# Patient Record
Sex: FEMALE | Race: BLACK OR AFRICAN AMERICAN
[De-identification: names, ages, dates, MRNs, and addresses within clinical notes are randomized per-mention and may not be internally consistent; named-entity substitution may affect disease eponyms.]

---

## 2017-10-08 ENCOUNTER — HOSPITAL ENCOUNTER (EMERGENCY)
Dept: HOSPITAL 72 - EMR | Age: 27
Discharge: HOME | End: 2017-10-08
Payer: MEDICAID

## 2017-10-08 VITALS — DIASTOLIC BLOOD PRESSURE: 82 MMHG | SYSTOLIC BLOOD PRESSURE: 124 MMHG

## 2017-10-08 VITALS — BODY MASS INDEX: 22.71 KG/M2 | HEIGHT: 64 IN | WEIGHT: 133 LBS

## 2017-10-08 DIAGNOSIS — O03.9: Primary | ICD-10-CM

## 2017-10-08 LAB
ALBUMIN/GLOB SERPL: 1.2 {RATIO} (ref 1–2.7)
ALT SERPL-CCNC: 14 U/L (ref 12–78)
ANION GAP SERPL CALC-SCNC: 7 MMOL/L (ref 5–15)
APPEARANCE UR: (no result)
APTT BLD: 28 SEC (ref 23–33)
AST SERPL-CCNC: 16 U/L (ref 15–37)
BACTERIA #/AREA URNS HPF: (no result) /HPF
BASOPHILS NFR BLD AUTO: 1.4 % (ref 0–2)
CALCIUM SERPL-MCNC: 8.9 MG/DL (ref 8.5–10.1)
CHLORIDE SERPL-SCNC: 107 MMOL/L (ref 98–107)
CO2 SERPL-SCNC: 26 MMOL/L (ref 21–32)
CREAT SERPL-MCNC: 0.9 MG/DL (ref 0.55–1)
EOSINOPHIL NFR BLD AUTO: 2.9 % (ref 0–3)
ERYTHROCYTE [DISTWIDTH] IN BLOOD BY AUTOMATED COUNT: 10.9 % (ref 11.6–14.8)
GFR SERPLBLD BASED ON 1.73 SQ M-ARVRAT: > 60 ML/MIN (ref 60–?)
GLOBULIN SER-MCNC: 3.4 G/DL
INR PPP: 1.1 (ref 0.9–1.1)
KETONES UR QL STRIP: NEGATIVE
LEUKOCYTE ESTERASE UR QL STRIP: NEGATIVE
LYMPHOCYTES NFR BLD AUTO: 33.9 % (ref 20–45)
MCH RBC QN AUTO: 32.5 PG (ref 27–31)
MCHC RBC AUTO-ENTMCNC: 33.3 G/DL (ref 32–36)
MCV RBC AUTO: 98 FL (ref 80–99)
MONOCYTES NFR BLD AUTO: 9.4 % (ref 1–10)
NEUTROPHILS NFR BLD AUTO: 52.4 % (ref 45–75)
NITRITE UR QL STRIP: NEGATIVE
PH UR STRIP: 7 [PH] (ref 4.5–8)
PLATELET # BLD: 244 K/UL (ref 150–450)
PMV BLD AUTO: 7.7 FL (ref 6.5–10.1)
POTASSIUM SERPL-SCNC: 4.1 MMOL/L (ref 3.5–5.1)
PROT SERPL-MCNC: 7.5 G/DL (ref 6.4–8.2)
PROT UR QL STRIP: (no result)
PROTHROMBIN TIME: 11.1 SEC (ref 9.3–11.5)
RBC # BLD AUTO: 4.34 M/UL (ref 4.2–5.4)
RBC #/AREA URNS HPF: (no result) /HPF (ref 0–2)
SODIUM SERPL-SCNC: 140 MMOL/L (ref 136–145)
SP GR UR STRIP: 1.01 (ref 1–1.03)
SQUAMOUS #/AREA URNS LPF: (no result) /LPF
UROBILINOGEN UR-MCNC: NORMAL MG/DL (ref 0–1)
WBC # BLD AUTO: 6.7 K/UL (ref 4.8–10.8)
WBC #/AREA URNS HPF: (no result) /HPF (ref 0–2)

## 2017-10-08 PROCEDURE — 96361 HYDRATE IV INFUSION ADD-ON: CPT

## 2017-10-08 PROCEDURE — 36415 COLL VENOUS BLD VENIPUNCTURE: CPT

## 2017-10-08 PROCEDURE — 81025 URINE PREGNANCY TEST: CPT

## 2017-10-08 PROCEDURE — 96374 THER/PROPH/DIAG INJ IV PUSH: CPT

## 2017-10-08 PROCEDURE — 85730 THROMBOPLASTIN TIME PARTIAL: CPT

## 2017-10-08 PROCEDURE — 85025 COMPLETE CBC W/AUTO DIFF WBC: CPT

## 2017-10-08 PROCEDURE — 86900 BLOOD TYPING SEROLOGIC ABO: CPT

## 2017-10-08 PROCEDURE — 86901 BLOOD TYPING SEROLOGIC RH(D): CPT

## 2017-10-08 PROCEDURE — 81003 URINALYSIS AUTO W/O SCOPE: CPT

## 2017-10-08 PROCEDURE — 86850 RBC ANTIBODY SCREEN: CPT

## 2017-10-08 PROCEDURE — 84702 CHORIONIC GONADOTROPIN TEST: CPT

## 2017-10-08 PROCEDURE — 85610 PROTHROMBIN TIME: CPT

## 2017-10-08 PROCEDURE — 80053 COMPREHEN METABOLIC PANEL: CPT

## 2017-10-08 PROCEDURE — 99284 EMERGENCY DEPT VISIT MOD MDM: CPT

## 2017-10-08 NOTE — EMERGENCY ROOM REPORT
History of Present Illness


General


Chief Complaint:  General Complaint


Source:  Patient





Present Illness


HPI


The patient is a 27-year-old female  presenting for possible miscarriage.  

She states that she is approximately one week late for her menstruation which 

is usually on time.  She is sexually active without use of contraceptives.  She 

states that she noticed a large piece of tissue passed yesterday as well as 

abdominal cramping.  She has been having spotting today with nausea but denies 

vomiting.  She denies any pain at this time.  She denies any other symptoms 

including fever, chills, back pain, dysuria


Allergies:  


Coded Allergies:  


     No Known Allergies (Unverified , 10/8/17)





Patient History


Past Medical History:  see triage record


Pertinent Family History:  none


Last Menstrual Period:  16


Reviewed Nursing Documentation:  PMH: Agreed, PSxH: Agreed





Nursing Documentation-PMH


Past Medical History:  No Stated History





Review of Systems


All Other Systems:  negative except mentioned in HPI





Physical Exam





Vital Signs








  Date Time  Temp Pulse Resp B/P (MAP) Pulse Ox O2 Delivery O2 Flow Rate FiO2


 


10/8/17 13:02 98.1 78 20 124/82 99 Room Air  








Sp02 EP Interpretation:  reviewed, normal


General Appearance:  no apparent distress, alert, GCS 15, non-toxic


Head:  normocephalic, atraumatic


Eyes:  bilateral eye normal inspection, bilateral eye PERRL


ENT:  hearing grossly normal, normal pharynx, no angioedema, normal voice


Gastrointestinal:  tenderness - suprapubic


Rectal:  deferred


Genitourinary:  normal inspection, no CVA tenderness


Musculoskeletal:  back normal, gait/station normal, normal range of motion, non-

tender


Neurologic:  alert, oriented x3, responsive, motor strength/tone normal, 

sensory intact, speech normal


Psychiatric:  judgement/insight normal, memory normal, mood/affect normal, no 

suicidal/homicidal ideation


Skin:  normal color, no rash, warm/dry, well hydrated


Lymphatic:  no adenopathy





Medical Decision Making


PA Attestation


Dr. Devine is my supervising physician. Patient management was discussed with 

my supervising physician


Diagnostic Impression:  


 Primary Impression:  


 Miscarriage


ER Course


The patient is a 27-year-old female  presenting for possible miscarriage





Differential diagnoses considered include but not limited to Early pregnancy, 

threatened , incomplete , complete , ectopic pregnancy, 

hemorrhagic cyst





PE: vitals stable. NAD


Abdomen is soft.  There is tenderness to palpation over suprapubic region only.

  No CVA tenderness





CBC, CMP unremarkable. 


Urine preg: neg


Beta hC


UA: no signs of infection





The patient is given IV fluids and Zofran and is feeling better.





She was informed of the results.  It is most likely patient has experienced a 

miscarriage.





ER precautions are





Laboratory Tests








Test


  10/8/17


13:08 10/8/17


13:50


 


Urine Color Pale yellow   


 


Urine Appearance Cloudy   


 


Urine pH 7 (4.5-8.0)   


 


Urine Specific Gravity


  1.015


(1.005-1.035) 


 


 


Urine Protein


  1+ (NEGATIVE)


H 


 


 


Urine Glucose (UA)


  Negative


(NEGATIVE) 


 


 


Urine Ketones


  Negative


(NEGATIVE) 


 


 


Urine Occult Blood


  5+ (NEGATIVE)


H 


 


 


Urine Nitrite


  Negative


(NEGATIVE) 


 


 


Urine Bilirubin


  Negative


(NEGATIVE) 


 


 


Urine Urobilinogen


  Normal MG/DL


(0.0-1.0) 


 


 


Urine Leukocyte Esterase


  Negative


(NEGATIVE) 


 


 


Urine RBC


  Tntc /HPF (0 -


2)  H 


 


 


Urine WBC


  2-4 /HPF (0 -


2) 


 


 


Urine Squamous Epithelial


Cells Moderate /LPF


(NONE/OCC)  H 


 


 


Urine Bacteria


  Few /HPF


(NONE) 


 


 


Urine HCG, Qualitative Negative   


 


White Blood Count


  


  6.7 K/UL


(4.8-10.8)


 


Red Blood Count


  


  4.34 M/UL


(4.20-5.40)


 


Hemoglobin


  


  14.1 G/DL


(12.0-16.0)


 


Hematocrit


  


  42.3 %


(37.0-47.0)


 


Mean Corpuscular Volume  98 FL (80-99)  


 


Mean Corpuscular Hemoglobin


  


  32.5 PG


(27.0-31.0)  H


 


Mean Corpuscular Hemoglobin


Concent 


  33.3 G/DL


(32.0-36.0)


 


Red Cell Distribution Width


  


  10.9 %


(11.6-14.8)  L


 


Platelet Count


  


  244 K/UL


(150-450)


 


Mean Platelet Volume


  


  7.7 FL


(6.5-10.1)


 


Neutrophils (%) (Auto)


  


  52.4 %


(45.0-75.0)


 


Lymphocytes (%) (Auto)


  


  33.9 %


(20.0-45.0)


 


Monocytes (%) (Auto)


  


  9.4 %


(1.0-10.0)


 


Eosinophils (%) (Auto)


  


  2.9 %


(0.0-3.0)


 


Basophils (%) (Auto)


  


  1.4 %


(0.0-2.0)


 


Prothrombin Time


  


  11.1 SEC


(9.30-11.50)


 


Prothrombin Time INR  1.1 (0.9-1.1)  


 


PTT


  


  28 SEC (23-33)


 


 


Sodium Level


  


  140 MMOL/L


(136-145)


 


Potassium Level


  


  4.1 MMOL/L


(3.5-5.1)


 


Chloride Level


  


  107 MMOL/L


()


 


Carbon Dioxide Level


  


  26 MMOL/L


(21-32)


 


Anion Gap  7 (5-15)  


 


Blood Urea Nitrogen


  


  6 mg/dL (7-18)


L


 


Creatinine


  


  0.9 MG/DL


(0.55-1.00)


 


Estimate Glomerular


Filtration Rate 


  > 60 mL/min


(>60)


 


Glucose Level


  


  82 MG/DL


()


 


Calcium Level


  


  8.9 MG/DL


(8.5-10.1)


 


Total Bilirubin


  


  0.7 MG/DL


(0.2-1.0)


 


Aspartate Amino Transferase


(AST) 


  16 U/L (15-37)


 


 


Alanine Aminotransferase (ALT)


  


  14 U/L (12-78)


 


 


Alkaline Phosphatase


  


  59 U/L


()


 


Total Protein


  


  7.5 G/DL


(6.4-8.2)


 


Albumin


  


  4.1 G/DL


(3.4-5.0)


 


Globulin  3.4 g/dL  


 


Albumin/Globulin Ratio  1.2 (1.0-2.7)  


 


Human Chorionic Gonadotropin,


Quant 


  < 1 mIU/mL  


 








Lab Results Impression


CBC, CMP unremarkable. 


Urine preg: neg


Beta hC


UA: no signs of infection





Last Vital Signs








  Date Time  Temp Pulse Resp B/P (MAP) Pulse Ox O2 Delivery O2 Flow Rate FiO2


 


10/8/17 13:02 98.1 78 20 124/82 99 Room Air  








Status:  improved


Disposition:  HOME, SELF-CARE


Condition:  Improved


Scripts


Ondansetron* (ZOFRAN*) 4 Mg Tablet


4 MG ORAL Q6H Y for Nausea & Vomiting, #10 TAB


   Prov: LEISA FLOREZ P.AWendy         10/8/17 


Acetaminophen* (TYLENOL EXTRA STRENGTH*) 500 Mg Tablet


500 MG ORAL Q8H Y for Prn Headache/Temp > 101, #30 TAB 0 Refills


   Prov: LEISA FLOREZ P.A.         10/8/17











LEISA FLOREZ P.PANDA Oct 8, 2017 13:24

## 2018-03-18 ENCOUNTER — HOSPITAL ENCOUNTER (EMERGENCY)
Dept: HOSPITAL 72 - EMR | Age: 28
Discharge: HOME | End: 2018-03-18
Payer: MEDICAID

## 2018-03-18 VITALS — SYSTOLIC BLOOD PRESSURE: 117 MMHG | DIASTOLIC BLOOD PRESSURE: 82 MMHG

## 2018-03-18 VITALS — WEIGHT: 130 LBS | BODY MASS INDEX: 22.2 KG/M2 | HEIGHT: 64 IN

## 2018-03-18 DIAGNOSIS — N93.8: Primary | ICD-10-CM

## 2018-03-18 LAB
APPEARANCE UR: CLEAR
APTT PPP: YELLOW S
GLUCOSE UR STRIP-MCNC: NEGATIVE MG/DL
KETONES UR QL STRIP: NEGATIVE
LEUKOCYTE ESTERASE UR QL STRIP: (no result)
NITRITE UR QL STRIP: NEGATIVE
PH UR STRIP: 5 [PH] (ref 4.5–8)
PROT UR QL STRIP: (no result)
SP GR UR STRIP: 1.02 (ref 1–1.03)
UROBILINOGEN UR-MCNC: NORMAL MG/DL (ref 0–1)

## 2018-03-18 PROCEDURE — 81003 URINALYSIS AUTO W/O SCOPE: CPT

## 2018-03-18 PROCEDURE — 81025 URINE PREGNANCY TEST: CPT

## 2018-03-18 PROCEDURE — 76856 US EXAM PELVIC COMPLETE: CPT

## 2018-03-18 PROCEDURE — 36415 COLL VENOUS BLD VENIPUNCTURE: CPT

## 2018-03-18 PROCEDURE — 99284 EMERGENCY DEPT VISIT MOD MDM: CPT

## 2018-03-18 NOTE — DIAGNOSTIC IMAGING REPORT
Indication: Pelvic pain. Last menstrual period 2/18/2018. 

 

Technique: Transabdominal and endovaginal pelvic ultrasound was performed.

 

Findings: The uterus measures 7.1 x 5 x 4.3 cm. Endometrium is 1.3 m in thickness.

Within the lower endometrial canal there is some echogenic material. No color flow is

noted within this material upon interrogation with Doppler.

 

Right ovary measures 4.1 x 3.3 x 2.1 cm. The left ovary measures 3. 2 x 2 1.8 cm.

Both ovaries contain multiple small simple appearing/follicular cysts. Vascular flow

to the bilateral ovaries is seen.

 

IMPRESSION: 

Echogenic material in the endometrial . If patient recently pregnant this may

represent retained products. There is no history of pregnancy, this may represent

menstrual blood. Correlation with clinical history/exam and beta hCG recommended.

Short-term interval follow-up ultrasound was recommended to assure resolution of this

finding.

 

No evidence to suggest ovarian torsion at this time.

## 2023-02-03 NOTE — EMERGENCY ROOM REPORT
History of Present Illness


General


Chief Complaint:  Vaginal


Source:  Patient





Present Illness


HPI


Patient presents with complaints of abnormal vaginal bleeding and possible 

miscarriage


Patient reports that her last menstrual cycle was last week


She only had bleeding for a few days


Which is not normal for her


She has continued to have right lower suprapubic discomfort and pressure since 

then


And presents for further eval


Denies any lower abdominal pain denies any vaginal discharge and as any dysuria 

or frequency denies any upper abdominal pain


Allergies:  


Coded Allergies:  


     No Known Allergies (Unverified , 10/8/17)





Patient History


Past Medical History:  see triage record


Pertinent Family History:  none


Last Menstrual Period:  2-18


:  3


Para:  0


Reviewed Nursing Documentation:  PMH: Agreed, PSxH: Agreed





Nursing Documentation-PMH


Past Medical History:  No Stated History





Review of Systems


All Other Systems:  negative except mentioned in HPI





Physical Exam





Vital Signs








  Date Time  Temp Pulse Resp B/P (MAP) Pulse Ox O2 Delivery O2 Flow Rate FiO2


 


3/18/18 10:44 98.0 83 18 117/82 98 Room Air  





 98.1       








Sp02 EP Interpretation:  reviewed, normal


General Appearance:  well appearing, no apparent distress


Head:  normocephalic, atraumatic


Eyes:  bilateral eye PERRL, bilateral eye EOMI


ENT:  hearing grossly normal, normal pharynx, TMs + canals normal, uvula midline


Neck:  full range of motion, supple, no meningismus, no bony tend


Respiratory:  lungs clear, normal breath sounds, no rhonchi, no respiratory 

distress, no retraction, no accessory muscle use


Cardiovascular #1:  normal peripheral pulses, regular rate, rhythm, no edema, 

no gallop, no JVD, no murmur


Gastrointestinal:  normal bowel sounds, non tender - Patient subjectively 

points to the right suprapubic area, soft, no mass, no organomegaly, non-

distended, no guarding, no hernia, no pulsatile mass, no rebound


Genitourinary:  no CVA tenderness


Musculoskeletal:  normal inspection


Neurologic:  oriented x3, responsive, CNs III-XII nml as tested, motor strength/

tone normal, sensory intact


Psychiatric:  mood/affect normal


Skin:  normal color, no rash, warm/dry, palpation normal


Lymphatic:  normal inspection, no adenopathy





Medical Decision Making


Diagnostic Impression:  


 Primary Impression:  


 dysfunctional uterine bleeding


ER Course


With the patient's history and examination, multiple differentials considered, 

including but not limited to pregnancy, ectopic pregnancy, ovarian torsion, 

gastritis, cholecystitis, pancreatitis, appendicitis





Patient's urine pregnancy test was negative


Ultrasound does not show any obvious torsion or cyst





Consideration for appendicitis is also made however the location of discomfort 

is suprapubic does not involve the abdominal region





Patient also afebrile and no complaints of any intra-abdominal discomfort 

patient nevertheless requires close outpatient follow-up and will return with 

any concerns or changes





Labs








Test


  3/18/18


10:50 3/18/18


12:15


 


Urine Color Yellow  


 


Urine Appearance Clear  


 


Urine pH 5 (4.5-8.0)  


 


Urine Specific Gravity


  1.020


(1.005-1.035) 


 


 


Urine Protein 2+ (NEGATIVE)  


 


Urine Glucose (UA)


  Negative


(NEGATIVE) 


 


 


Urine Ketones


  Negative


(NEGATIVE) 


 


 


Urine Occult Blood


  Negative


(NEGATIVE) 


 


 


Urine Nitrite


  Negative


(NEGATIVE) 


 


 


Urine Bilirubin


  Negative


(NEGATIVE) 


 


 


Urine Urobilinogen


  Normal MG/DL


(0.0-1.0) 


 


 


Urine Leukocyte Esterase 1+ (NEGATIVE)  


 


Urine RBC


  0-2 /HPF (0 -


2) 


 


 


Urine WBC


  2-4 /HPF (0 -


2) 


 


 


Urine Squamous Epithelial


Cells Few /LPF


(NONE/OCC) 


 


 


Urine Bacteria


  Occasional


/HPF (NONE) 


 


 


Urine HCG, Qualitative


  Negative


(NEGATIVE) 


 








CT/MRI/US Diagnostic Results


CT/MRI/US Diagnostic Results :  


   Impression


Pelvic ultrasound: Refer to report for full specifics no obvious acute pathology





Last Vital Signs








  Date Time  Temp Pulse Resp B/P (MAP) Pulse Ox O2 Delivery O2 Flow Rate FiO2


 


3/18/18 11:07 98.1  18 117/82 98 Room Air  





 98.1       


 


3/18/18 10:44  83      








Status:  improved


Disposition:  HOME, SELF-CARE


Condition:  Improved


Scripts


Ibuprofen* (MOTRIN*) 600 Mg Tablet


600 MG ORAL Q8H Y for For Pain, #20 TAB 0 Refills


   Prov: Madalyn Mack DO         3/18/18


Referrals:  


NOT CHOSEN [facility],REFERRING (PCP)


Patient Instructions:  Dysfunctional Uterine Bleeding





Additional Instructions:  


Patient is provided with the discharge instructions notified to follow up with 

primary doctor in the next 2-3 days otherwise return to the er with any 

worsening symptoms.


Please note that this report is being documented using DRAGON technology.  This 

can lead to erroneous entry secondary to incorrect interpretation by the 

dictating instrument.











Madalyn Mack DO Mar 18, 2018 12:56 normal...